# Patient Record
Sex: FEMALE
[De-identification: names, ages, dates, MRNs, and addresses within clinical notes are randomized per-mention and may not be internally consistent; named-entity substitution may affect disease eponyms.]

---

## 2018-11-19 LAB
BUN BLD-MCNC: 17 MG/DL (ref 7–18)
GLUCOSE SERPLBLD-MCNC: 100 MG/DL (ref 74–106)
HCT VFR BLD CALC: 40.7 % (ref 36–45)
LYMPHOCYTES # SPEC AUTO: 1 K/UL (ref 0.7–4.9)
MCH RBC QN AUTO: 33.2 PG (ref 27–35)
MCV RBC: 95.3 FL (ref 80–100)
PMV BLD: 8.2 FL (ref 7.6–11.3)
POTASSIUM SERPL-SCNC: 3.7 MMOL/L (ref 3.5–5.1)
RBC # BLD: 4.27 M/UL (ref 3.86–4.86)

## 2018-11-19 NOTE — RAD REPORT
EXAM DESCRIPTION:  RAD - Chest Pa And Lat (2 Views) - 11/19/2018 4:15 pm

 

CLINICAL HISTORY:  preop

Chest pain.

 

COMPARISON:  CHEST SINGLE VIEW dated 9/30/2015

 

FINDINGS:  The lungs are clear. The heart is normal in size. No displaced fractures. Left axillary no
kate dissection clips. Right-sided port catheter tip in the SVC.

 

IMPRESSION:  No acute or concerning finding suspected.

## 2018-11-21 ENCOUNTER — HOSPITAL ENCOUNTER (OUTPATIENT)
Dept: HOSPITAL 97 - OR | Age: 48
Discharge: HOME | End: 2018-11-21
Attending: SURGERY
Payer: MEDICAID

## 2018-11-21 VITALS — TEMPERATURE: 97.3 F | SYSTOLIC BLOOD PRESSURE: 104 MMHG | DIASTOLIC BLOOD PRESSURE: 69 MMHG

## 2018-11-21 VITALS — OXYGEN SATURATION: 100 %

## 2018-11-21 DIAGNOSIS — Z90.13: ICD-10-CM

## 2018-11-21 DIAGNOSIS — Z88.8: ICD-10-CM

## 2018-11-21 DIAGNOSIS — Z83.3: ICD-10-CM

## 2018-11-21 DIAGNOSIS — C50.919: ICD-10-CM

## 2018-11-21 DIAGNOSIS — Z82.49: ICD-10-CM

## 2018-11-21 DIAGNOSIS — Z45.2: Primary | ICD-10-CM

## 2018-11-21 PROCEDURE — 85025 COMPLETE CBC W/AUTO DIFF WBC: CPT

## 2018-11-21 PROCEDURE — 36415 COLL VENOUS BLD VENIPUNCTURE: CPT

## 2018-11-21 PROCEDURE — 81025 URINE PREGNANCY TEST: CPT

## 2018-11-21 PROCEDURE — 88300 SURGICAL PATH GROSS: CPT

## 2018-11-21 PROCEDURE — 71046 X-RAY EXAM CHEST 2 VIEWS: CPT

## 2018-11-21 PROCEDURE — 80048 BASIC METABOLIC PNL TOTAL CA: CPT

## 2018-11-21 PROCEDURE — 93005 ELECTROCARDIOGRAM TRACING: CPT

## 2018-11-21 PROCEDURE — 0JPT0WZ REMOVAL OF TOTALLY IMPLANTABLE VASCULAR ACCESS DEVICE FROM TRUNK SUBCUTANEOUS TISSUE AND FASCIA, OPEN APPROACH: ICD-10-PCS

## 2018-11-21 NOTE — OP
Date of Procedure:  11/21/2018



Surgeon:  Dennys Mccall MD



Preoperative Diagnosis:  Breast cancer.



Postoperative Diagnosis:  Breast cancer.



Procedure:  Removal of Port-A-Cath.



Findings:  As above.



Specimen:  Intact Port-A-Cath.



Anesthesia:  MAC.



Indication:  This is a case of a female who comes to us for removal of Port-A-Cath.  Benefits, altern
atives and risks of removal were fully explained, which include but are not limited to infection, ble
eding, damage to adjacent structures, anesthesia complication, PE, pulmonary emboli, DVTs, MI and susie
n death.  She also understands this may not relieve any symptoms.  She might need more than one surgi
shanika intervention.  She understood, signed a consent.



Description Of Procedure:  The patient was brought to the operating room, placed in supine position. 
 Anesthesia was done without complication.  The right chest was prepped and draped in a sterile fashi
on.  A time-out was called.  Local anesthetic was applied followed by sharp incision of the skin in t
he chest region.  The Port-A-Cath was found removed from the subcutaneous tissue, pulled without resi
stance, intact.  Pressure was applied for 15 minutes on the insertion point and then closed with 3-0 
chromic in a subcuticular fashion with Steri-Strips on top.  Sponge count and instrument counts 

were correct.  The patient tolerated the procedure well.  The patient is on her way to recovery in st
able condition.





FAITH

DD:  11/21/2018 10:00:40Voice ID:  [id number]

DT:  11/21/2018 21:03:37Report ID:  [id number]

## 2018-11-21 NOTE — DS
Date of Discharge:  11/21/2018



Diagnosis:  Breast cancer.



Procedure:  Removal of Port-A-Cath.



Disposition:  Home.



Activity:  As tolerated.  No heavy lifting.



Followup:  Followup in my office in 1 week.



Discharge Instructions:  Call for appointment [phone].  Keep area dry for 48 hours, then may shower.
  Keep Steri-Strips intact.



Medications:  Tylenol No. 3 q.4 hours p.r.n. for pain.





CARISSA/GULSHAN

DD:  11/21/2018 10:00:40Voice ID:  [id number]

DT:  11/21/2018 21:19:57Report ID:  [id number]

## 2018-11-21 NOTE — P.BOP
Preoperative diagnosis: breast cancer


Postoperative diagnosis: same


Primary procedure: Removal of portacath


Estimated blood loss: <5cc


Specimen: intact portacath


Findings: as above


Anesthesia: MAC


Complications: None